# Patient Record
Sex: FEMALE | Race: WHITE | NOT HISPANIC OR LATINO | Employment: UNEMPLOYED | URBAN - METROPOLITAN AREA
[De-identification: names, ages, dates, MRNs, and addresses within clinical notes are randomized per-mention and may not be internally consistent; named-entity substitution may affect disease eponyms.]

---

## 2021-02-25 ENCOUNTER — THERAPY VISIT (OUTPATIENT)
Dept: PHYSICAL THERAPY | Facility: CLINIC | Age: 56
End: 2021-02-25
Payer: COMMERCIAL

## 2021-02-25 DIAGNOSIS — M25.511 BILATERAL SHOULDER PAIN: ICD-10-CM

## 2021-02-25 DIAGNOSIS — M25.512 BILATERAL SHOULDER PAIN: ICD-10-CM

## 2021-02-25 PROCEDURE — 97110 THERAPEUTIC EXERCISES: CPT | Mod: GP | Performed by: PHYSICAL THERAPIST

## 2021-02-25 PROCEDURE — 97162 PT EVAL MOD COMPLEX 30 MIN: CPT | Mod: GP | Performed by: PHYSICAL THERAPIST

## 2021-02-25 NOTE — PROGRESS NOTES
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 She played sports in school, and has OA and ankylosing spondylitis. She is scheduled for  left shoulder decompression surgery in March, with her right shoulder also being scheduled later for surgery. Juan' friend recommended therapy because it helped her shoulder, but we had good discussion today that she does have a diagnosis that will require surgery.  is her surgeon. This visit was for mild scapular retraining and to maintain her AROM prior to her procedure.    The history is provided by the patient. No  was used.   Therapist Generated HPI Evaluation         Type of problem:  Bilateral shoulders.    This is a chronic condition.  Condition occurred with:  Repetition/overuse.  Where condition occurred: for unknown reasons.  Patient reports pain:  Anterior.  Pain is described as aching, sharp, shooting and stabbing and is intermittent.  Pain is the same all the time.  Since onset symptoms are gradually worsening.  Associated symptoms:  Catching, locking, loss of motion/stiffness and loss of strength. Symptoms are exacerbated by certain positions, using arm at shoulder level, using arm overhead, using arm behind back, lifting and lying on extremity  and relieved by nothing.      Restrictions due to condition include:  Working in normal job without restrictions (did get a standing desk).  Barriers include:  None as reported by patient.    Patient Health History  Farida Aguiar being seen for Bilateral shoulder impingement.          Pain is reported as 5/10 on pain scale.  General health as reported by patient is good.  Pertinent medical history includes: concussions/dizziness, depression, heart problems, high blood pressure, incontinence, migraines/headaches, numbness/tingling, osteoarthritis and  sleep disorder/apnea.   Red flags:  Cold/hot extremity, pain at rest/night and severe headaches.  Medical allergies: latex and adhesives.   Surgeries include:  Orthopedic surgery and other. Other surgery history details: fractures of Left wrist and fingers, heart ablation, breast surgery and hysterectomy.    Current medications:  Anti-depressants, anti-inflammatory, cardiac medication, high blood pressure medication, muscle relaxants and sleep medication.    Current occupation is High School nurse.   Primary job tasks include:  Computer work and prolonged sitting.                                    Objective:  Standing Alignment:    Cervical/Thoracic:  Forward head  Shoulder/UE:  Rounded shoulders                                       Shoulder Evaluation:  ROM:  AROM:    Flexion:  Left:  160    Right:  160    Abduction:  Left: 110   Right:  120                            Strength:  : good strength below shoulder level.                        Special Tests:    Left shoulder positive for the following special tests:  Impingement  Right shoulder positive for the following special tests:Impingement  Palpation:    Left shoulder tenderness present at:  Biceps and Supraspinatus  Right shoulder tenderness present at: Biceps and Supraspinatus                                                                          Musculoskeletal:        Arms:        ROS    Assessment/Plan:    Patient is a 55 year old female with both sides shoulder complaints.    Patient has the following significant findings with corresponding treatment plan.                Diagnosis 1:  Bilateral shoulder pain  Pain -  self management, education and home program  Decreased ROM/flexibility - manual therapy, therapeutic exercise and home program  Impaired muscle performance - neuro re-education and home program  Decreased function - therapeutic activities and home program  Impaired posture - neuro re-education and home program    Therapy Evaluation Codes:    1) History comprised of:   Personal factors that impact the plan of care:      None.    Comorbidity factors that impact the plan of care are:      Concussion, Depression, Dizziness, Heart problems, High blood pressure, Numbness/tingling, Osteoarthritis, Sleep disorder/apnea and Ankylosing spondylitis.     Medications impacting care: Anti-depressant, Anti-inflammatory, Cardiac, High blood pressure, Muscle relaxant and Sleep.  2) Examination of Body Systems comprised of:   Body structures and functions that impact the plan of care:      Shoulder.   Activity limitations that impact the plan of care are:      Lifting, Reading/Computer work, Working and Sleeping.  3) Clinical presentation characteristics are:   Evolving/Changing.  4) Decision-Making    Moderate complexity using standardized patient assessment instrument and/or measureable assessment of functional outcome.  Cumulative Therapy Evaluation is: Moderate complexity.    Previous and current functional limitations:  (See Goal Flow Sheet for this information)    Short term and Long term goals: (See Goal Flow Sheet for this information)     Communication ability:  Patient appears to be able to clearly communicate and understand verbal and written communication and follow directions correctly.  Treatment Explanation - The following has been discussed with the patient:   RX ordered/plan of care  Anticipated outcomes  Possible risks and side effects     Rehab potential is good.    Frequency:  1 X week, once daily  Duration:  for 2 weeks    Recommended that PT is not the answer for her symptoms. She is scheduled for shoulder surgery in March. HEP to maintain ROM and scapular stability.    Please refer to the daily flowsheet for treatment today, total treatment time and time spent performing 1:1 timed codes.

## 2021-04-14 PROBLEM — M25.512 BILATERAL SHOULDER PAIN: Status: RESOLVED | Noted: 2021-02-25 | Resolved: 2021-04-14

## 2021-04-14 PROBLEM — M25.511 BILATERAL SHOULDER PAIN: Status: RESOLVED | Noted: 2021-02-25 | Resolved: 2021-04-14

## 2022-01-31 ENCOUNTER — HOSPITAL ENCOUNTER (EMERGENCY)
Facility: CLINIC | Age: 57
Discharge: HOME | End: 2022-01-31
Attending: EMERGENCY MEDICINE | Admitting: EMERGENCY MEDICINE
Payer: COMMERCIAL

## 2022-01-31 ENCOUNTER — APPOINTMENT (OUTPATIENT)
Dept: GENERAL RADIOLOGY | Facility: CLINIC | Age: 57
End: 2022-01-31
Attending: EMERGENCY MEDICINE
Payer: COMMERCIAL

## 2022-01-31 ENCOUNTER — APPOINTMENT (OUTPATIENT)
Dept: ULTRASOUND IMAGING | Facility: CLINIC | Age: 57
End: 2022-01-31
Attending: EMERGENCY MEDICINE
Payer: COMMERCIAL

## 2022-01-31 VITALS
OXYGEN SATURATION: 100 % | SYSTOLIC BLOOD PRESSURE: 137 MMHG | TEMPERATURE: 97.1 F | DIASTOLIC BLOOD PRESSURE: 66 MMHG | HEART RATE: 78 BPM | RESPIRATION RATE: 16 BRPM

## 2022-01-31 DIAGNOSIS — M79.661 PAIN OF RIGHT LOWER LEG: ICD-10-CM

## 2022-01-31 DIAGNOSIS — T14.8XXA HEMATOMA OF SKIN: ICD-10-CM

## 2022-01-31 PROCEDURE — 93971 EXTREMITY STUDY: CPT | Mod: RT

## 2022-01-31 PROCEDURE — 73590 X-RAY EXAM OF LOWER LEG: CPT | Mod: RT

## 2022-01-31 PROCEDURE — 73610 X-RAY EXAM OF ANKLE: CPT | Mod: RT

## 2022-01-31 PROCEDURE — 73630 X-RAY EXAM OF FOOT: CPT | Mod: RT

## 2022-01-31 PROCEDURE — 99285 EMERGENCY DEPT VISIT HI MDM: CPT | Mod: 25

## 2022-01-31 ASSESSMENT — ENCOUNTER SYMPTOMS: MYALGIAS: 1

## 2022-01-31 NOTE — ED PROVIDER NOTES
History   Chief Complaint:  Leg Injury       HPI   Farida Aguiar is a 56 year old female who presents with a leg injury. The patient reports that she was on a boat in the [place] 1 week ago and lost her balance and fell down 6 or 7 stairs. She just returned today from her trip and states that her right leg has become increasingly swollen so concerned about a blood clot. Bruising was worse early but has improved. No laceration on the leg when it happened but there was more of a scrape on right lateral leg when it initially happened and that is where most of pain was initially . She is able to walk with some discomfort and has been taking Tylenol as needed. No fever. No weakness.    Review of Systems   Cardiovascular: Positive for leg swelling.   Musculoskeletal: Positive for myalgias.   All other systems reviewed and are negative.    Allergies:  Gluten  Nifedipine  Percocet [Oxycodone-Acetaminophen]  Latex    Medications:  Flexeril  Lexapro  Hydrochlorothiazide  Provigil    Past Medical History:     Dysplastic nevus  Cherry angioma  Seborrheic keratosis    Past Surgical History:    Biopsy skin lesion     Social History:  Patient presents alone    Physical Exam     Patient Vitals for the past 24 hrs:   BP Temp Temp src Pulse Resp SpO2   01/31/22 0034 (!) 140/59 97.1  F (36.2  C) Temporal 83 16 100 %       Physical Exam  General: Sitting up in bed, appears comfortable  Eyes:  The pupils are equal and round    Conjunctivae and sclerae are normal  ENT:    Wearing a mask  Neck:  Normal range of motion  CV:  Regular rate, regular rhythm    DP/PT pulses 2+ on right foot    Skin warm and well perfused   Resp:  Non labored breathing on room air    No tachypnea    No cough heard  MS:  Mild edema on right calf extending to dorsum of foot/ankle. Compartments soft on right calf/foot. More firmness on right lateral mid calf where there is area of mild ecchymosis  Skin:  Mild ecchymosis on right mid lateral calf.  Mild ecchymosis at ankle.  Neuro:   Awake, alert.      Speech is normal and fluent.    Face is symmetric.     Moves all extremities equally    Equal dorsiflexion/plantarflexion    SILT on right foot/leg  Psych: Normal affect.  Appropriate interactions.    Emergency Department Course     Imaging:  Ankle XR, G/E 3 views, right   Final Result   IMPRESSION: Diffuse soft tissue swelling. No acute fracture dislocation. Congruent ankle mortise. Small plantar calcaneal enthesophyte. Normal Lisfranc joint.       Foot  XR, G/E 3 views, right   Final Result   IMPRESSION: Diffuse soft tissue swelling. No acute fracture dislocation. Congruent ankle mortise. Small plantar calcaneal enthesophyte. Normal Lisfranc joint.       XR Tibia & Fibula Right 2 Views   Final Result   IMPRESSION: Normal right tibia and fibula. No fracture.      US Lower Extremity Venous Duplex Right   Final Result   IMPRESSION:   1.  No deep venous thrombosis in the right lower extremity.   2.  Complex fluid right mid calf laterally region of injury presumably hematoma.        Report per radiology    Emergency Department Course:       Reviewed:  I reviewed nursing notes, vitals, past medical history and Care Everywhere    Assessments:  0038 I obtained history and examined the patient as noted above.    Rechecked patient and at imaging  0315 Patient sleeping. I rechecked the patient and explained findings.     Disposition:  The patient was discharged to home.     Impression & Plan     Medical Decision Making:  Farida Aguiar is a 56-year-old female who presented to the emergency department with leg injury. Patient fell about 1 week ago while on vacation. Was not seen after the injury. Came in after noticed more swelling in her foot. There appears to be some dependent ecchymosis and swelling on her foot and ankle but the main area of her discomfort is on her right lateral mid calf where there is an area that is slightly more firm with overlying  ecchymosis. No overlying erythema. Her compartments are soft. She is neurovascularly intact and doubt compartment syndrome at this time. She appears comfortable sitting in bed and declined any medications for pain. She was able to sleep in the emergency department after imaging. X-ray does not show any fracture. She was concerned for DVT but no DVT seen. The area on exam that is slightly firm correlates with the likely hematoma seen on ultrasound. I doubt abscess given no overlying skin changes to suggest this. She has had no fever. No evidence of necrotizing fascitis. I discussed continued symptomatic treatment at home which includes elevation, icing and she was given an Ace wrap to help with mild compression. Discussed follow-up with orthopedics this week. Discussed symptoms to monitor for which would include weakness, worsening pain, firm compartments/calf.    Diagnosis:    ICD-10-CM    1. Pain of right lower leg  M79.661    2. Hematoma of skin  T14.8XXA      Scribe Disclosure:  I, Melissa Figueroa, am serving as a scribe at 12:37 AM on 1/31/2022 to document services personally performed by Park Moralez MD based on my observations and the provider's statements to me.          Park Moralez MD  01/31/22 07

## 2022-01-31 NOTE — ED TRIAGE NOTES
Just got back from the [place].  She had fallen and hurt her right lower leg and her bruising and swelling are increasing.  She is worried for a blood clot.

## 2022-01-31 NOTE — DISCHARGE INSTRUCTIONS
Elevate leg above the heart  Ice area  ACE wrap for some mild compression  Watch for spreading redness on the area of hematoma, fever  Follow up with orthopedics  Watch for increasing pain not controlled by over the counter medications, weakness of foot

## 2024-11-08 RX ORDER — DEXTROAMPHETAMINE SACCHARATE, AMPHETAMINE ASPARTATE, DEXTROAMPHETAMINE SULFATE AND AMPHETAMINE SULFATE 2.5; 2.5; 2.5; 2.5 MG/1; MG/1; MG/1; MG/1
1 TABLET ORAL 2 TIMES DAILY
COMMUNITY
Start: 2024-05-29

## 2024-11-08 RX ORDER — SPIRONOLACTONE 25 MG/1
25 TABLET ORAL DAILY
COMMUNITY
Start: 2024-02-08

## 2024-11-08 RX ORDER — METOPROLOL SUCCINATE 50 MG/1
50 TABLET, EXTENDED RELEASE ORAL DAILY
COMMUNITY
Start: 2024-02-08

## 2024-11-08 RX ORDER — UPADACITINIB 15 MG/1
1 TABLET, EXTENDED RELEASE ORAL DAILY
COMMUNITY
Start: 2024-07-22

## 2024-11-08 RX ORDER — ROPINIROLE 0.25 MG/1
0.25 TABLET, FILM COATED ORAL AT BEDTIME
COMMUNITY
Start: 2024-02-08

## 2024-11-08 RX ORDER — ACETAMINOPHEN 500 MG
1000 TABLET ORAL EVERY 6 HOURS PRN
COMMUNITY

## 2024-11-08 RX ORDER — IBUPROFEN 200 MG
200 TABLET ORAL EVERY 4 HOURS PRN
COMMUNITY

## 2024-11-08 RX ORDER — SUMATRIPTAN 50 MG/1
50 TABLET, FILM COATED ORAL
COMMUNITY

## 2024-11-08 RX ORDER — SERTRALINE HYDROCHLORIDE 100 MG/1
1 TABLET, FILM COATED ORAL EVERY MORNING
COMMUNITY
Start: 2024-02-08

## 2024-11-08 RX ORDER — ATORVASTATIN CALCIUM 40 MG/1
1 TABLET, FILM COATED ORAL DAILY
COMMUNITY

## 2024-11-18 ENCOUNTER — ANESTHESIA (OUTPATIENT)
Dept: SURGERY | Facility: CLINIC | Age: 59
End: 2024-11-18
Payer: COMMERCIAL

## 2024-11-18 ENCOUNTER — HOSPITAL ENCOUNTER (OUTPATIENT)
Facility: CLINIC | Age: 59
Discharge: HOME | End: 2024-11-18
Attending: INTERNAL MEDICINE | Admitting: INTERNAL MEDICINE
Payer: COMMERCIAL

## 2024-11-18 ENCOUNTER — ANESTHESIA EVENT (OUTPATIENT)
Dept: SURGERY | Facility: CLINIC | Age: 59
End: 2024-11-18
Payer: COMMERCIAL

## 2024-11-18 VITALS
OXYGEN SATURATION: 100 % | RESPIRATION RATE: 18 BRPM | BODY MASS INDEX: 27.81 KG/M2 | SYSTOLIC BLOOD PRESSURE: 135 MMHG | HEIGHT: 65 IN | WEIGHT: 166.9 LBS | TEMPERATURE: 97.6 F | DIASTOLIC BLOOD PRESSURE: 87 MMHG | HEART RATE: 73 BPM

## 2024-11-18 LAB — COLONOSCOPY: NORMAL

## 2024-11-18 PROCEDURE — 999N000141 HC STATISTIC PRE-PROCEDURE NURSING ASSESSMENT: Performed by: INTERNAL MEDICINE

## 2024-11-18 PROCEDURE — 370N000017 HC ANESTHESIA TECHNICAL FEE, PER MIN: Performed by: INTERNAL MEDICINE

## 2024-11-18 PROCEDURE — 272N000001 HC OR GENERAL SUPPLY STERILE: Performed by: INTERNAL MEDICINE

## 2024-11-18 PROCEDURE — 88305 TISSUE EXAM BY PATHOLOGIST: CPT | Mod: TC | Performed by: INTERNAL MEDICINE

## 2024-11-18 PROCEDURE — 250N000011 HC RX IP 250 OP 636: Performed by: NURSE ANESTHETIST, CERTIFIED REGISTERED

## 2024-11-18 PROCEDURE — 360N000075 HC SURGERY LEVEL 2, PER MIN: Performed by: INTERNAL MEDICINE

## 2024-11-18 PROCEDURE — 710N000012 HC RECOVERY PHASE 2, PER MINUTE: Performed by: INTERNAL MEDICINE

## 2024-11-18 PROCEDURE — 258N000003 HC RX IP 258 OP 636: Performed by: NURSE ANESTHETIST, CERTIFIED REGISTERED

## 2024-11-18 RX ORDER — PROPOFOL 10 MG/ML
INJECTION, EMULSION INTRAVENOUS CONTINUOUS PRN
Status: DISCONTINUED | OUTPATIENT
Start: 2024-11-18 | End: 2024-11-18

## 2024-11-18 RX ORDER — LIDOCAINE 40 MG/G
CREAM TOPICAL
Status: DISCONTINUED | OUTPATIENT
Start: 2024-11-18 | End: 2024-11-18 | Stop reason: HOSPADM

## 2024-11-18 RX ORDER — DEXAMETHASONE SODIUM PHOSPHATE 4 MG/ML
4 INJECTION, SOLUTION INTRA-ARTICULAR; INTRALESIONAL; INTRAMUSCULAR; INTRAVENOUS; SOFT TISSUE
Status: DISCONTINUED | OUTPATIENT
Start: 2024-11-18 | End: 2024-11-18 | Stop reason: HOSPADM

## 2024-11-18 RX ORDER — ONDANSETRON 4 MG/1
4 TABLET, ORALLY DISINTEGRATING ORAL EVERY 30 MIN PRN
Status: DISCONTINUED | OUTPATIENT
Start: 2024-11-18 | End: 2024-11-18 | Stop reason: HOSPADM

## 2024-11-18 RX ORDER — ONDANSETRON 4 MG/1
4 TABLET, ORALLY DISINTEGRATING ORAL EVERY 6 HOURS PRN
Status: CANCELLED | OUTPATIENT
Start: 2024-11-18

## 2024-11-18 RX ORDER — NALOXONE HYDROCHLORIDE 0.4 MG/ML
0.1 INJECTION, SOLUTION INTRAMUSCULAR; INTRAVENOUS; SUBCUTANEOUS
Status: DISCONTINUED | OUTPATIENT
Start: 2024-11-18 | End: 2024-11-18 | Stop reason: HOSPADM

## 2024-11-18 RX ORDER — SODIUM CHLORIDE, SODIUM LACTATE, POTASSIUM CHLORIDE, CALCIUM CHLORIDE 600; 310; 30; 20 MG/100ML; MG/100ML; MG/100ML; MG/100ML
INJECTION, SOLUTION INTRAVENOUS CONTINUOUS
Status: DISCONTINUED | OUTPATIENT
Start: 2024-11-18 | End: 2024-11-18

## 2024-11-18 RX ORDER — ONDANSETRON 2 MG/ML
4 INJECTION INTRAMUSCULAR; INTRAVENOUS EVERY 6 HOURS PRN
Status: CANCELLED | OUTPATIENT
Start: 2024-11-18

## 2024-11-18 RX ORDER — PROCHLORPERAZINE MALEATE 10 MG
10 TABLET ORAL EVERY 6 HOURS PRN
Status: CANCELLED | OUTPATIENT
Start: 2024-11-18

## 2024-11-18 RX ORDER — ONDANSETRON 2 MG/ML
4 INJECTION INTRAMUSCULAR; INTRAVENOUS EVERY 30 MIN PRN
Status: DISCONTINUED | OUTPATIENT
Start: 2024-11-18 | End: 2024-11-18 | Stop reason: HOSPADM

## 2024-11-18 RX ORDER — SODIUM CHLORIDE, SODIUM LACTATE, POTASSIUM CHLORIDE, CALCIUM CHLORIDE 600; 310; 30; 20 MG/100ML; MG/100ML; MG/100ML; MG/100ML
INJECTION, SOLUTION INTRAVENOUS CONTINUOUS PRN
Status: DISCONTINUED | OUTPATIENT
Start: 2024-11-18 | End: 2024-11-18

## 2024-11-18 RX ORDER — ONDANSETRON 2 MG/ML
4 INJECTION INTRAMUSCULAR; INTRAVENOUS
Status: DISCONTINUED | OUTPATIENT
Start: 2024-11-18 | End: 2024-11-18 | Stop reason: HOSPADM

## 2024-11-18 RX ORDER — FENTANYL CITRATE 50 UG/ML
25 INJECTION, SOLUTION INTRAMUSCULAR; INTRAVENOUS
Status: DISCONTINUED | OUTPATIENT
Start: 2024-11-18 | End: 2024-11-18 | Stop reason: HOSPADM

## 2024-11-18 RX ORDER — FLUMAZENIL 0.1 MG/ML
0.2 INJECTION, SOLUTION INTRAVENOUS
Status: CANCELLED | OUTPATIENT
Start: 2024-11-18 | End: 2024-11-18

## 2024-11-18 RX ADMIN — PROPOFOL 125 MCG/KG/MIN: 10 INJECTION, EMULSION INTRAVENOUS at 08:11

## 2024-11-18 RX ADMIN — SODIUM CHLORIDE, POTASSIUM CHLORIDE, SODIUM LACTATE AND CALCIUM CHLORIDE: 600; 310; 30; 20 INJECTION, SOLUTION INTRAVENOUS at 08:07

## 2024-11-18 ASSESSMENT — ACTIVITIES OF DAILY LIVING (ADL)
ADLS_ACUITY_SCORE: 0

## 2024-11-18 NOTE — ANESTHESIA POSTPROCEDURE EVALUATION
Patient: Farida Aguiar    Procedure: Procedure(s):  Colonoscopy with polypectomies       Anesthesia Type:  MAC    Note:  Disposition: Outpatient   Postop Pain Control: Uneventful            Sign Out: Well controlled pain   PONV: No   Neuro/Psych: Uneventful            Sign Out: Acceptable/Baseline neuro status   Airway/Respiratory: Uneventful            Sign Out: Acceptable/Baseline resp. status   CV/Hemodynamics: Uneventful            Sign Out: Acceptable CV status; No obvious hypovolemia; No obvious fluid overload   Other NRE: NONE   DID A NON-ROUTINE EVENT OCCUR? No           Last vitals:  Vitals Value Taken Time   /87 11/18/24 0914   Temp 97.6  F (36.4  C) 11/18/24 0914   Pulse 73 11/18/24 0914   Resp 18 11/18/24 0840   SpO2 100 % 11/18/24 0915   Vitals shown include unfiled device data.    Electronically Signed By: Jesu Guerrero MD  November 18, 2024  12:29 PM

## 2024-11-18 NOTE — ANESTHESIA PREPROCEDURE EVALUATION
Anesthesia Pre-Procedure Evaluation    Patient: Farida Aguiar   MRN: [id number] : 1965        Procedure : Procedure(s):  Colonoscopy          Past Medical History:   Diagnosis Date    Arthritis     Gastroesophageal reflux disease     Hypertension     PONV (postoperative nausea and vomiting)     Sleep apnea     Von Willebrand's disease (H)       Past Surgical History:   Procedure Laterality Date    BIOPSY OF SKIN LESION      BREAST BIOPSY, RT/LT Right     COLONOSCOPY      HYSTERECTOMY  2010    IR LUMBAR PUNCTURE  10/29/2021    REPAIR BLADDER  2010    SMALL BOWEL RESECTION        Allergies   Allergen Reactions    Latex Dermatitis, Other (See Comments) and Rash     Swelling in area where touched and contact dermatitis lasts for weeks    Other Reaction(s): skin welts      Swelling in area where touched and contact dermatitis lasts for weeks    Oxycodone-Acetaminophen Hives, Itching, Nausea and Rash     PN: LW Reaction: Nausea Tylenol ok      PN: LW Reaction: Nausea    Chlorhexidine Dermatitis    Penicillins Dermatitis, Hives and Rash     Other Reaction(s): Dermatitis    Povidone Iodine Rash    Adhesive Tape      Band-aids    Gluten Diarrhea, GI Disturbance and Nausea     Severe abdominal pain    Other Reaction(s): Abdominal pain, Gastrointestinal      Severe abdominal pain    Nifedipine Swelling     Swelling in lower legs    Nsaids Other (See Comments)     NOT an allergy.  But should not be used due to von Willebrands    Should not be taken due to VonWillebrands    Percocet [Oxycodone-Acetaminophen] Itching    Morphine And Codeine Other (See Comments), Itching and Rash    Oxycodone Itching and Rash     Other Reaction(s): nausea, vomiting      Social History     Tobacco Use    Smoking status: Never    Smokeless tobacco: Never   Substance Use Topics    Alcohol use: Yes     Comment: rare      Wt Readings from Last 1 Encounters:   24 75.7 kg (166 lb 14.4 oz)        Anesthesia Evaluation      "   History of anesthetic complications  - PONV.      ROS/MED HX  ENT/Pulmonary:     (+) sleep apnea, moderate, uses CPAP,                                      Neurologic:       Cardiovascular:     (+)  hypertension- -   -  - -                                      METS/Exercise Tolerance:     Hematologic: Comments: Von Willebrand's disease    No lab results found.   No lab results found.        Musculoskeletal:       GI/Hepatic:     (+) GERD,                   Renal/Genitourinary:       Endo:       Psychiatric/Substance Use:       Infectious Disease:       Malignancy:       Other:            Physical Exam    Airway        Mallampati: II   TM distance: > 3 FB   Neck ROM: full   Mouth opening: > 3 cm    Respiratory Devices and Support         Dental       (+) Minor Abnormalities - some fillings, tiny chips      Cardiovascular   cardiovascular exam normal          Pulmonary   pulmonary exam normal                OUTSIDE LABS:  CBC:   Lab Results   Component Value Date     04/13/2006     BMP: No results found for: \"NA\", \"POTASSIUM\", \"CHLORIDE\", \"CO2\", \"BUN\", \"CR\", \"GLC\"  COAGS: No results found for: \"PTT\", \"INR\", \"FIBR\"  POC: No results found for: \"BGM\", \"HCG\", \"HCGS\"  HEPATIC: No results found for: \"ALBUMIN\", \"PROTTOTAL\", \"ALT\", \"AST\", \"GGT\", \"ALKPHOS\", \"BILITOTAL\", \"BILIDIRECT\", \"MARIANELA\"  OTHER: No results found for: \"PH\", \"LACT\", \"A1C\", \"VIKA\", \"PHOS\", \"MAG\", \"LIPASE\", \"AMYLASE\", \"TSH\", \"T4\", \"T3\", \"CRP\", \"SED\"    Anesthesia Plan    ASA Status:  2    NPO Status:  NPO Appropriate    Anesthesia Type: MAC.     - Reason for MAC: immobility needed   Induction: Propofol.   Maintenance: TIVA.        Consents    Anesthesia Plan(s) and associated risks, benefits, and realistic alternatives discussed. Questions answered and patient/representative(s) expressed understanding.     - Discussed:     - Discussed with:  Patient      - Extended Intubation/Ventilatory Support Discussed: No.      - Patient is DNR/DNI Status: No   " "  Use of blood products discussed: No .     Postoperative Care    Pain management: IV analgesics.   PONV prophylaxis: Ondansetron (or other 5HT-3)     Comments:               Jesu Guerrero MD    I have reviewed the pertinent notes and labs in the chart from the past 30 days and (re)examined the patient.  Any updates or changes from those notes are reflected in this note.                         # Overweight: Estimated body mass index is 27.77 kg/m  as calculated from the following:    Height as of this encounter: 1.651 m (5' 5\").    Weight as of this encounter: 75.7 kg (166 lb 14.4 oz).             "

## 2024-11-18 NOTE — ANESTHESIA CARE TRANSFER NOTE
Patient: Farida Aguiar    Procedure: Procedure(s):  Colonoscopy with polypectomies       Diagnosis: Screen for colon cancer [Z12.11]  Diagnosis Additional Information: No value filed.    Anesthesia Type:   MAC     Note:    Oropharynx: oropharynx clear of all foreign objects  Level of Consciousness: awake  Oxygen Supplementation: room air    Independent Airway: airway patency satisfactory and stable  Dentition: dentition unchanged  Vital Signs Stable: post-procedure vital signs reviewed and stable  Report to RN Given: handoff report given  Patient transferred to: Phase II    Handoff Report: Identifed the Patient, Identified the Reponsible Provider, Reviewed the pertinent medical history, Discussed the surgical course, Reviewed Intra-OP anesthesia mangement and issues during anesthesia, Set expectations for post-procedure period and Allowed opportunity for questions and acknowledgement of understanding  Vitals:  Vitals Value Taken Time   BP     Temp     Pulse     Resp     SpO2         Electronically Signed By: DILEEP Miller CRNA  November 18, 2024  8:39 AM

## 2024-11-19 LAB
PATH REPORT.COMMENTS IMP SPEC: NORMAL
PATH REPORT.COMMENTS IMP SPEC: NORMAL
PATH REPORT.FINAL DX SPEC: NORMAL
PATH REPORT.GROSS SPEC: NORMAL
PATH REPORT.MICROSCOPIC SPEC OTHER STN: NORMAL
PATH REPORT.RELEVANT HX SPEC: NORMAL
PHOTO IMAGE: NORMAL

## 2024-11-19 PROCEDURE — 88305 TISSUE EXAM BY PATHOLOGIST: CPT | Mod: 26 | Performed by: PATHOLOGY

## 2024-12-11 PROBLEM — D12.6 ADENOMATOUS COLON POLYP: Status: ACTIVE | Noted: 2024-12-11

## 2025-02-24 ENCOUNTER — LAB REQUISITION (OUTPATIENT)
Dept: LAB | Facility: CLINIC | Age: 60
End: 2025-02-24
Payer: COMMERCIAL

## 2025-02-24 DIAGNOSIS — R21 RASH AND OTHER NONSPECIFIC SKIN ERUPTION: ICD-10-CM

## 2025-02-24 PROCEDURE — 87186 SC STD MICRODIL/AGAR DIL: CPT | Mod: ORL | Performed by: SPECIALIST

## 2025-02-24 PROCEDURE — 87070 CULTURE OTHR SPECIMN AEROBIC: CPT | Mod: ORL | Performed by: SPECIALIST

## 2025-02-27 LAB
BACTERIA WND CULT: ABNORMAL

## 2025-03-22 ENCOUNTER — HEALTH MAINTENANCE LETTER (OUTPATIENT)
Age: 60
End: 2025-03-22

## (undated) DEVICE — ENDO SNARE POLYPECTOMY OVAL 15MM LOOP SD-240U-15

## (undated) DEVICE — TUBING SUCTION MEDI-VAC SOFT 3/16"X20' N520A

## (undated) DEVICE — SUCTION MANIFOLD NEPTUNE 2 SYS 4 PORT 0702-020-000

## (undated) DEVICE — APPLICATORS COTTON TIP 6" X2

## (undated) DEVICE — BAG CLEAR TRASH 1.3M 39X33" P4040C

## (undated) DEVICE — PAD CHUX UNDERPAD 30X36" P3036C

## (undated) DEVICE — SPECIMEN TRAP MUCOUS SIMILAC NTRL CARE GRAD 80ML 0045860

## (undated) DEVICE — KIT PROCEDURE W/CLEAN-A-SCOPE LINERS V2 200800